# Patient Record
Sex: FEMALE | Race: BLACK OR AFRICAN AMERICAN | Employment: OTHER | URBAN - METROPOLITAN AREA
[De-identification: names, ages, dates, MRNs, and addresses within clinical notes are randomized per-mention and may not be internally consistent; named-entity substitution may affect disease eponyms.]

---

## 2020-01-01 ENCOUNTER — APPOINTMENT (OUTPATIENT)
Dept: NUCLEAR MEDICINE | Facility: HOSPITAL | Age: 85
DRG: 193 | End: 2020-01-01
Attending: EMERGENCY MEDICINE
Payer: MEDICARE

## 2020-01-01 ENCOUNTER — APPOINTMENT (OUTPATIENT)
Dept: GENERAL RADIOLOGY | Facility: HOSPITAL | Age: 85
DRG: 193 | End: 2020-01-01
Attending: HOSPITALIST
Payer: MEDICARE

## 2020-01-01 ENCOUNTER — APPOINTMENT (OUTPATIENT)
Dept: CV DIAGNOSTICS | Facility: HOSPITAL | Age: 85
DRG: 193 | End: 2020-01-01
Attending: INTERNAL MEDICINE
Payer: MEDICARE

## 2020-01-01 ENCOUNTER — HOSPITAL ENCOUNTER (INPATIENT)
Facility: HOSPITAL | Age: 85
LOS: 9 days | Discharge: HOME HEALTH | DRG: 193 | End: 2020-01-01
Attending: EMERGENCY MEDICINE | Admitting: HOSPITALIST
Payer: MEDICARE

## 2020-01-01 ENCOUNTER — APPOINTMENT (OUTPATIENT)
Dept: GENERAL RADIOLOGY | Facility: HOSPITAL | Age: 85
DRG: 193 | End: 2020-01-01
Attending: EMERGENCY MEDICINE
Payer: MEDICARE

## 2020-01-01 VITALS
WEIGHT: 162 LBS | SYSTOLIC BLOOD PRESSURE: 121 MMHG | OXYGEN SATURATION: 96 % | TEMPERATURE: 97 F | HEART RATE: 77 BPM | DIASTOLIC BLOOD PRESSURE: 49 MMHG | HEIGHT: 68 IN | BODY MASS INDEX: 24.55 KG/M2 | RESPIRATION RATE: 18 BRPM

## 2020-01-01 DIAGNOSIS — R77.8 ELEVATED TROPONIN: ICD-10-CM

## 2020-01-01 DIAGNOSIS — U07.1 COVID-19: Primary | ICD-10-CM

## 2020-01-01 PROCEDURE — 99233 SBSQ HOSP IP/OBS HIGH 50: CPT | Performed by: HOSPITALIST

## 2020-01-01 PROCEDURE — 99232 SBSQ HOSP IP/OBS MODERATE 35: CPT | Performed by: INTERNAL MEDICINE

## 2020-01-01 PROCEDURE — 71045 X-RAY EXAM CHEST 1 VIEW: CPT | Performed by: HOSPITALIST

## 2020-01-01 PROCEDURE — 93306 TTE W/DOPPLER COMPLETE: CPT | Performed by: INTERNAL MEDICINE

## 2020-01-01 PROCEDURE — 99232 SBSQ HOSP IP/OBS MODERATE 35: CPT | Performed by: PHYSICIAN ASSISTANT

## 2020-01-01 PROCEDURE — 99223 1ST HOSP IP/OBS HIGH 75: CPT | Performed by: HOSPITALIST

## 2020-01-01 PROCEDURE — 78580 LUNG PERFUSION IMAGING: CPT | Performed by: EMERGENCY MEDICINE

## 2020-01-01 PROCEDURE — 99233 SBSQ HOSP IP/OBS HIGH 50: CPT | Performed by: INTERNAL MEDICINE

## 2020-01-01 PROCEDURE — 71045 X-RAY EXAM CHEST 1 VIEW: CPT | Performed by: EMERGENCY MEDICINE

## 2020-01-01 PROCEDURE — 99239 HOSP IP/OBS DSCHRG MGMT >30: CPT | Performed by: HOSPITALIST

## 2020-01-01 PROCEDURE — 99221 1ST HOSP IP/OBS SF/LOW 40: CPT | Performed by: INTERNAL MEDICINE

## 2020-01-01 RX ORDER — AMLODIPINE BESYLATE 10 MG/1
10 TABLET ORAL DAILY
Status: DISCONTINUED | OUTPATIENT
Start: 2020-01-01 | End: 2020-01-01

## 2020-01-01 RX ORDER — GUAIFENESIN 100 MG/5ML
200 SOLUTION ORAL EVERY 4 HOURS PRN
Status: DISCONTINUED | OUTPATIENT
Start: 2020-01-01 | End: 2020-01-01

## 2020-01-01 RX ORDER — DEXAMETHASONE SODIUM PHOSPHATE 10 MG/ML
10 INJECTION, SOLUTION INTRAMUSCULAR; INTRAVENOUS ONCE
Status: COMPLETED | OUTPATIENT
Start: 2020-01-01 | End: 2020-01-01

## 2020-01-01 RX ORDER — LOSARTAN POTASSIUM 100 MG/1
100 TABLET ORAL DAILY
Status: DISCONTINUED | OUTPATIENT
Start: 2020-01-01 | End: 2020-01-01

## 2020-01-01 RX ORDER — POLYETHYLENE GLYCOL 3350 17 G/17G
17 POWDER, FOR SOLUTION ORAL DAILY PRN
Qty: 1 EACH | Refills: 0 | Status: SHIPPED | OUTPATIENT
Start: 2020-01-01

## 2020-01-01 RX ORDER — PSEUDOEPHEDRINE HCL 30 MG
100 TABLET ORAL 2 TIMES DAILY
Qty: 30 CAPSULE | Refills: 0 | Status: SHIPPED | OUTPATIENT
Start: 2020-01-01

## 2020-01-01 RX ORDER — DOCUSATE SODIUM 100 MG/1
100 CAPSULE, LIQUID FILLED ORAL 2 TIMES DAILY
Status: DISCONTINUED | OUTPATIENT
Start: 2020-01-01 | End: 2020-01-01

## 2020-01-01 RX ORDER — FUROSEMIDE 10 MG/ML
80 INJECTION INTRAMUSCULAR; INTRAVENOUS ONCE
Status: COMPLETED | OUTPATIENT
Start: 2020-01-01 | End: 2020-01-01

## 2020-01-01 RX ORDER — AMLODIPINE BESYLATE 10 MG/1
10 TABLET ORAL DAILY
Status: ON HOLD | COMMUNITY
End: 2020-01-01

## 2020-01-01 RX ORDER — ASPIRIN 81 MG/1
324 TABLET, CHEWABLE ORAL ONCE
Status: COMPLETED | OUTPATIENT
Start: 2020-01-01 | End: 2020-01-01

## 2020-01-01 RX ORDER — DEXAMETHASONE SODIUM PHOSPHATE 4 MG/ML
6 VIAL (ML) INJECTION DAILY
Status: DISCONTINUED | OUTPATIENT
Start: 2020-01-01 | End: 2020-01-01

## 2020-01-01 RX ORDER — PANTOPRAZOLE SODIUM 20 MG/1
20 TABLET, DELAYED RELEASE ORAL
Status: DISCONTINUED | OUTPATIENT
Start: 2020-01-01 | End: 2020-01-01

## 2020-01-01 RX ORDER — BISACODYL 10 MG
10 SUPPOSITORY, RECTAL RECTAL
Status: DISCONTINUED | OUTPATIENT
Start: 2020-01-01 | End: 2020-01-01

## 2020-01-01 RX ORDER — OMEPRAZOLE 20 MG/1
20 CAPSULE, DELAYED RELEASE ORAL
COMMUNITY

## 2020-01-01 RX ORDER — ASPIRIN 81 MG/1
81 TABLET, CHEWABLE ORAL DAILY
COMMUNITY

## 2020-01-01 RX ORDER — LEVOFLOXACIN 500 MG/1
500 TABLET, FILM COATED ORAL ONCE
Status: COMPLETED | OUTPATIENT
Start: 2020-01-01 | End: 2020-01-01

## 2020-01-01 RX ORDER — MAG HYDROX/ALUMINUM HYD/SIMETH 400-400-40
5000 SUSPENSION, ORAL (FINAL DOSE FORM) ORAL DAILY
COMMUNITY

## 2020-01-01 RX ORDER — FUROSEMIDE 10 MG/ML
40 INJECTION INTRAMUSCULAR; INTRAVENOUS ONCE
Status: COMPLETED | OUTPATIENT
Start: 2020-01-01 | End: 2020-01-01

## 2020-01-01 RX ORDER — ALLOPURINOL 100 MG/1
100 TABLET ORAL DAILY
COMMUNITY

## 2020-01-01 RX ORDER — LOSARTAN POTASSIUM 100 MG/1
100 TABLET ORAL DAILY
COMMUNITY

## 2020-01-01 RX ORDER — DEXTROSE MONOHYDRATE 25 G/50ML
50 INJECTION, SOLUTION INTRAVENOUS
Status: DISCONTINUED | OUTPATIENT
Start: 2020-01-01 | End: 2020-01-01

## 2020-01-01 RX ORDER — ALLOPURINOL 100 MG/1
100 TABLET ORAL DAILY
Status: DISCONTINUED | OUTPATIENT
Start: 2020-01-01 | End: 2020-01-01

## 2020-01-01 RX ORDER — DIPYRIDAMOLE 25 MG
50 TABLET ORAL DAILY
Status: DISCONTINUED | OUTPATIENT
Start: 2020-01-01 | End: 2020-01-01

## 2020-01-01 RX ORDER — DEXAMETHASONE SODIUM PHOSPHATE 4 MG/ML
6 VIAL (ML) INJECTION EVERY 24 HOURS
Status: DISCONTINUED | OUTPATIENT
Start: 2020-01-01 | End: 2020-01-01

## 2020-01-01 RX ORDER — AZITHROMYCIN 250 MG/1
500 TABLET, FILM COATED ORAL ONCE
Status: DISCONTINUED | OUTPATIENT
Start: 2020-01-01 | End: 2020-01-01

## 2020-01-01 RX ORDER — ONDANSETRON 2 MG/ML
4 INJECTION INTRAMUSCULAR; INTRAVENOUS EVERY 6 HOURS PRN
Status: DISCONTINUED | OUTPATIENT
Start: 2020-01-01 | End: 2020-01-01

## 2020-01-01 RX ORDER — ACETAMINOPHEN 325 MG/1
650 TABLET ORAL EVERY 6 HOURS PRN
Status: DISCONTINUED | OUTPATIENT
Start: 2020-01-01 | End: 2020-01-01

## 2020-01-01 RX ORDER — AZITHROMYCIN 250 MG/1
500 TABLET, FILM COATED ORAL ONCE
Status: COMPLETED | OUTPATIENT
Start: 2020-01-01 | End: 2020-01-01

## 2020-01-01 RX ORDER — MULTIVIT WITH MINERALS/LUTEIN
1000 TABLET ORAL DAILY
COMMUNITY

## 2020-01-01 RX ORDER — ASPIRIN 81 MG/1
81 TABLET, CHEWABLE ORAL DAILY
Status: DISCONTINUED | OUTPATIENT
Start: 2020-01-01 | End: 2020-01-01

## 2020-01-01 RX ORDER — HEPARIN SODIUM 5000 [USP'U]/ML
5000 INJECTION, SOLUTION INTRAVENOUS; SUBCUTANEOUS EVERY 12 HOURS SCHEDULED
Status: DISCONTINUED | OUTPATIENT
Start: 2020-01-01 | End: 2020-01-01

## 2020-01-01 RX ORDER — DIPYRIDAMOLE 50 MG
50 TABLET ORAL DAILY
COMMUNITY

## 2020-01-01 RX ORDER — METOCLOPRAMIDE HYDROCHLORIDE 5 MG/ML
5 INJECTION INTRAMUSCULAR; INTRAVENOUS EVERY 8 HOURS PRN
Status: DISCONTINUED | OUTPATIENT
Start: 2020-01-01 | End: 2020-01-01

## 2020-01-01 RX ORDER — SODIUM PHOSPHATE, DIBASIC AND SODIUM PHOSPHATE, MONOBASIC 7; 19 G/133ML; G/133ML
1 ENEMA RECTAL ONCE AS NEEDED
Status: DISCONTINUED | OUTPATIENT
Start: 2020-01-01 | End: 2020-01-01

## 2020-01-01 RX ORDER — DEXAMETHASONE SODIUM PHOSPHATE 4 MG/ML
4 VIAL (ML) INJECTION DAILY
Status: DISCONTINUED | OUTPATIENT
Start: 2020-01-01 | End: 2020-01-01

## 2020-01-01 RX ORDER — IPRATROPIUM BROMIDE AND ALBUTEROL SULFATE 2.5; .5 MG/3ML; MG/3ML
3 SOLUTION RESPIRATORY (INHALATION) EVERY 6 HOURS PRN
Qty: 300 ML | Refills: 0 | Status: SHIPPED | OUTPATIENT
Start: 2020-01-01 | End: 2021-01-18

## 2020-01-01 RX ORDER — DEXAMETHASONE 4 MG/1
4 TABLET ORAL
Qty: 2 TABLET | Refills: 0 | Status: SHIPPED | OUTPATIENT
Start: 2020-01-01

## 2020-01-01 RX ORDER — PANTOPRAZOLE SODIUM 40 MG/1
40 TABLET, DELAYED RELEASE ORAL
Status: DISCONTINUED | OUTPATIENT
Start: 2020-01-01 | End: 2020-01-01

## 2020-01-01 RX ORDER — POLYETHYLENE GLYCOL 3350 17 G/17G
17 POWDER, FOR SOLUTION ORAL DAILY PRN
Status: DISCONTINUED | OUTPATIENT
Start: 2020-01-01 | End: 2020-01-01

## 2020-12-11 PROBLEM — U07.1 COVID-19: Status: ACTIVE | Noted: 2020-01-01

## 2020-12-11 PROBLEM — R77.8 ELEVATED TROPONIN: Status: ACTIVE | Noted: 2020-01-01

## 2020-12-11 PROBLEM — R09.02 HYPOXEMIA: Status: ACTIVE | Noted: 2020-01-01

## 2020-12-11 PROCEDURE — 99223 1ST HOSP IP/OBS HIGH 75: CPT | Performed by: INTERNAL MEDICINE

## 2020-12-11 NOTE — CONSULTS
[facility] AND [facility]  [facility] Cardiology Consult Note    Philippe Goldman Patient Status:  Inpatient    1925 MRN [id number]   Location [facility]5W Attending Harika Schaeffer MD   Hosp Day # 0 PCP No primary care provider on file.      79 yo female murmur, pericardial rub, S3.  Lungs: Clear without wheezes, rales, rhonchi or dullness. Normal excursions and effort. Abdomen: Soft, non-tender. BS-present. Extremities: Without clubbing, cyanosis or edema. Peripheral pulses are 2+.   Neurologic: Alert

## 2020-12-11 NOTE — CONSULTS
[facility] HOSP - [facility]    Report of Consultation    Neela Reynolds Patient Status:  Inpatient    1925 MRN [id number]   Location [facility]5W Attending Rony Fall MD   Hosp Day # 0 PCP Iliana Luque MD     Date of Admission: Exam:   Blood pressure 154/74, pulse 90, temperature 97.5 °F (36.4 °C), temperature source Temporal, resp. rate 24, SpO2 95 %.     Constitutional: no acute distress  Eyes: PERRL  ENT: nares patent  Cardio: RRR, S1 S2  Respiratory: Some basilar crackles pres as tolerated. -Chest x-ray on presentation with bilateral airspace opacities seen  -Elevated D-dimer. Awaiting VQ scan results  -Continue Decadron at this time  -Evaluated by cardiology. 2D echo pending. Diuresis as tolerated.   -Reviewed vitals, labs a

## 2020-12-11 NOTE — ED PROVIDER NOTES
Patient Seen in: [facility] AND [facility] Emergency Department      History   Patient presents with:  Shortness Of Breath    Stated Complaint: SOB COVID    HPI  89 yoF with PMH of hypertension, gout, remote pulmonary embolism, presents for evaluation of dyspnea Heart sounds: Normal heart sounds. Pulmonary:      Effort: Pulmonary effort is normal.      Comments: Bibasilar crackles  Abdominal:      General: There is no distension. Palpations: Abdomen is soft. Tenderness:  There is no abdominal tenderness All other components within normal limits   PROTHROMBIN TIME (PT) - Abnormal; Notable for the following components:    PT 15.5 (*)     INR 1.25 (*)     All other components within normal limits   PTT, ACTIVATED - Abnormal; Notable for the following compone are advised. Dictated by (CST): Vick Palacios MD on 12/11/2020 at 12:12 PM     Finalized by (CST): Vick Palacios MD on 12/11/2020 at 12:14 PM              MDM    On arrival to the ED, patient is hypoxic approximately 85% on 3 L nasal cannula.   She was Noted POA    COVID-19 U07.1 12/11/2020 Unknown

## 2020-12-11 NOTE — ED INITIAL ASSESSMENT (HPI)
Pt BIBEMS from home with c/o worsening COVID SOB, was on 3L NC at home, found at 83%, EMS placed pt on NRB at 15, sats WNL. Pt sats drop to 78% ORA.  Pt denies CP

## 2020-12-11 NOTE — ED NOTES
Orders for admission, patient is aware of plan and ready to go upstairs, any questions, please call ED RN Greer Padilla at ext: [phone]  VQ scan ordered due to GFR for CT PE r/o  Pt's daughter Bishop Pastrana would like to be contacted on PC, she is POA  [phone]

## 2020-12-11 NOTE — CONSULTS
[facility]    Report of [facility] ID Consultation    Redd Spencer Patient Status:  Emergency    1925 MRN [id number]   Location [address] Attending Christy Luevano MD   Hosp Day # 0 PCP No primary care provider wounds        Results:     Laboratory Data:  Lab Results   Component Value Date    WBC 15.7 (H) 12/11/2020    HGB 13.0 12/11/2020    HCT 38.7 12/11/2020    .0 12/11/2020    CREATSERUM 1.89 (H) 12/11/2020    BUN 33 (H) 12/11/2020     (L) 12/11/

## 2020-12-11 NOTE — H&P
[facility]    PATIENT'S NAME: Nolberto Galvan   ATTENDING PHYSICIAN: Zenaida Celeste MD   PATIENT ACCOUNT#:   [de-identified]    LOCATION:  [place] [address]  MEDICAL RECORD #:   [id number]       YOB: 1925  ADMISSION DATE:       12/11/2020 HISTORY:  Lives with her family. Requires assistance in her basic activities of daily living. REVIEW OF SYSTEMS:  Patient is a poor historian, not able to provide much of history, but she said she is short of breath.   Per the family, this shortness o respiratory failure. PLAN:  The patient will be admitted to the general medical floor, remote telemetry. IV Rocephin, azithromycin, Decadron. Pulmonary and ID consults. Oxygen protocol.   DVT prophylaxis versus continue her anticoagulation from home

## 2020-12-12 PROCEDURE — 99232 SBSQ HOSP IP/OBS MODERATE 35: CPT | Performed by: NURSE PRACTITIONER

## 2020-12-12 NOTE — PROGRESS NOTES
Worsening hypoxia likely secondary to covid and not heart failure    Echo EF normal, CVP by IVC measurement 3 mmHg. If renal function worsens consider giving fluid back after lasix challenge today.       [facility], 12/11/20, 11:05 PM  INTERVENTIONAL CARDI

## 2020-12-12 NOTE — PROGRESS NOTES
[facility]    Progress Note      Assessment and Plan:   1. Respiratory failure status post acute Covid pneumonia–my suspicion is that the radiographic abnormalities of the postinflammatory or fibrotic changes associated with Covid.   The pa INR 1.25 12/11/2020    DDIMER 6.24 12/12/2020    CRP 30.50 12/12/2020    TROP 0.138 12/11/2020     12/11/2020     Right greater than left bilateral patchy infiltrates    Emre Potter MD  Medical Director, Critical Care, [address] [place] [address]  Medical Director,

## 2020-12-12 NOTE — PLAN OF CARE
Covid +  Symptom Onset:  Tmax:  O2    Monitor Labs  LDH: 428  Ferritin: 821  CRP: 30.50  DDimer: 6.24  Troponin: 0.138    Antibiotics: Azithromycin  Blood Thinner:  Decadron: started 12/11  Actemra:  RDV:  CCP:    Xray: bilateral airspace opacities seen  V

## 2020-12-12 NOTE — PROGRESS NOTES
[facility]    Progress Note    Morenita Goel Patient Status:  Inpatient    1925 MRN [id number]   Location [facility]5W Attending Misha Dale,*   Hosp Day # 1 PCP Jose Kingston MD       Subjective:    Tamie Santacruz secondary to covid and not heart failure   Echo EF normal, CVP by IVC measurement 3 mmHg. If renal function worsens consider giving fluid back after lasix challenge today.     Type II NSTEMI  trop peak 0.139    CKD   Renal dysfunction Cr 1.89 to 1.64 after bundle branch block with left axis -bifascicular block.   Voltage criteria for LVH met -Voltage criteria w/o ST/T abnormality may be normal. ABNORMAL No previous ECGs available Electronically signed on 12/11/2020 at 12:34 by Brennon Vallecillo

## 2020-12-12 NOTE — OCCUPATIONAL THERAPY NOTE
OCCUPATIONAL THERAPY EVALUATION - INPATIENT      Room Number: 602/067-N  Evaluation Date: 12/12/2020  Type of Evaluation: Initial       Physician Order: IP Consult to Occupational Therapy  Reason for Therapy: ADL/IADL Dysfunction and Discharge Planning occupational therapy while IP to maximize patient function and increase patient participation, safety, and independence with basic ADL and everyday activities.        DISCHARGE RECOMMENDATIONS: HIEN      OT Device Recommendations: TBD    PLAN  OT Treatment P taking off regular upper body clothing?: A Lot  -   Taking care of personal grooming such as brushing teeth?: A Lot  -   Eating meals?: A Lot    AM-PAC Score:  Score: 12  Approx Degree of Impairment: 66.57%  Standardized Score (AM-PAC Scale): 30.6  CMS Mod

## 2020-12-12 NOTE — PLAN OF CARE
Rapid and PCR negative for Covid over night. Weaning O2 between 8-10L HFNC. SATs drop to 80's and SOB. Fall precautions in place. Call light return demo and within reach. Will continue to monitor with frequent rounding.     Problem: Patient Centered Care  G

## 2020-12-12 NOTE — PHYSICAL THERAPY NOTE
PHYSICAL THERAPY EVALUATION - INPATIENT     Room Number: 953/831-B  Evaluation Date: 12/12/2020  Type of Evaluation: Initial   Physician Order: PT Eval and Treat    Presenting Problem: Hypoxemia, bibasilar pneumonia (history +COVID 4 weeks ago).     Reason Form.  Research supports that patients with this level of impairment may benefit from sub-acute rehab to optimize functional outcomes. Patient will benefit from continued IP PT services to address these deficits in preparation for discharge.     [id number] Preet Lacey functional limits     Lower extremity strength is impaired bilateral LE's: 3-/5    BALANCE  Static Sitting: Fair +  Dynamic Sitting: Fair  Static Standing: Not tested  Dynamic Standing: Not tested    ACTIVITY TOLERANCE  Pulse: 86  Heart Rate Source: Lion Rasmussen minimum assistance with walker - rolling     Goal #2  Current Status    Goal #3 Patient is able to ambulate 25 feet with assist device: walker - rolling at assistance level: minimum assistance while maintaining SpO2>90% with activity   Goal #3   Current St

## 2020-12-12 NOTE — PROGRESS NOTES
[facility] HOSP - [facility]    Progress Note    Jennifer Gaucher Patient Status:  Inpatient    1925 MRN [id number]   Location [facility]5W Attending Minor Jade,*   Hosp Day # 1 PCP Estevan Ruff MD        Subjective:     Petty Dickson 12/12/2020    K 4.7 12/12/2020     12/12/2020    CO2 22.0 12/12/2020     (H) 12/12/2020    CA 9.4 12/12/2020    ALB 2.6 (L) 12/12/2020    ALKPHO 99 12/12/2020    BILT 0.3 12/12/2020    TP 8.7 (H) 12/12/2020    AST 28 12/12/2020    ALT 16 12/12

## 2020-12-12 NOTE — PROGRESS NOTES
INFECTIOUS DISEASE PROGRESS NOTE  [facility] OF [facility] ID PROGRESS NOTE    Royce Aguilar Patient Status:  Inpatient    1925 MRN [id number]   Location [facility]5W Attending Hemal Castellon Day # 1 PCP Flaca Kaiser -  Continue on ceftriaxone and azithromycin.  -  Decadron.  -  DC COVID-19 isolation.  -  Follow fever curve, wbc. Repeat CBC/diff tomorrow AM.  -  Reviewed labs, micro, imaging reports.  -  Case d/w patient, RN.     Jackson Hatch PA-C   [facility]

## 2020-12-13 PROCEDURE — 99232 SBSQ HOSP IP/OBS MODERATE 35: CPT | Performed by: NURSE PRACTITIONER

## 2020-12-13 NOTE — PROGRESS NOTES
Pulmonary/Critical Care Follow Up Note    HPI:   Dami Grossman is a 80year old female with Patient presents with:  Savannah Brooke      PCP Alessandro Kraus MD  Admission Attending Ab Harrison MD    Hospital Day #2    Weakness  No sob  No coug Allergies        PHYSICAL EXAM:   Blood pressure 143/52, pulse 78, temperature 98.4 °F (36.9 °C), temperature source Oral, resp. rate 20, weight 164 lb 8 oz (74.6 kg), SpO2 98 %.     Intake/Output Summary (Last 24 hours) at 12/13/2020 1307  Last data filed

## 2020-12-13 NOTE — PROGRESS NOTES
[facility]    Progress Note    [facility] Patient Status:  Inpatient    1925 MRN [id number]   Location [facility]5W Attending Екатерина Watson,*   Hosp Day # 2 PCP Sal Sanford MD        Subjective:     Olga Cheek 12/13/2020    K 4.6 12/13/2020     12/13/2020    CO2 24.0 12/13/2020     (H) 12/13/2020    CA 9.2 12/13/2020    ALB 2.6 (L) 12/12/2020    ALKPHO 99 12/12/2020    BILT 0.3 12/12/2020    TP 8.7 (H) 12/12/2020    AST 28 12/12/2020    ALT 16 12/12

## 2020-12-13 NOTE — PLAN OF CARE
Problem: Patient Centered Care  Goal: Patient preferences are identified and integrated in the patient's plan of care  Description: Interventions:  - What would you like us to know as we care for you? I live with my niece and she is my POA.   - Provide ti

## 2020-12-13 NOTE — PROGRESS NOTES
[facility]    [facility] Note    Lise Coulter Patient Status:  Inpatient    1925 MRN [id number]   Location [facility]5W Attending Arely Nunn,*   Hosp Day # 2 PCP Roya Angelo MD       Subjective:    Chay Aguilera bilateral.        Assessment and Plan:       COVID pneumonia   seems to be decompensation, CXR infiltrates unclear if worse or better.    BNP 1400 and mildly elevated troponin may suggest component of heart failure contributing,   heart failure would not e 12/13/2020 at 7:39 AM                  ANGELI Miranda  12/13/2020

## 2020-12-14 PROCEDURE — 99232 SBSQ HOSP IP/OBS MODERATE 35: CPT | Performed by: NURSE PRACTITIONER

## 2020-12-14 NOTE — PLAN OF CARE
IV abx. IV decadron. NC at 3L. Patient's O2 saturations dropped to 78% after PT ambulated pt to the chair. NC was increased to 5L for an hour. Patient turns self. Encouraged her to turn and reposition throughout the day. Continue to monitor.  Call light wit stability  Description: INTERVENTIONS:  - Monitor vital signs, rhythm, and trends  - Monitor for bleeding, hypotension and signs of decreased cardiac output  - Evaluate effectiveness of vasoactive medications to optimize hemodynamic stability  - Monitor ar

## 2020-12-14 NOTE — CM/SW NOTE
TIM received MDO for home health. Per chart review, PT recommending subacute rehab. TIM contacted patient's niece, Christina Wyman P# [phone], to complete discharge planning assessment. Adelso Menezes stated that she lives with patient in a one level home. reasonable and necessary. SW/CM to follow for discharge planning and support.     Jerrol Rinne, MSW, LSW

## 2020-12-14 NOTE — PROGRESS NOTES
[facility]    Progress Note    Jaymie San German Patient Status:  Inpatient    1925 MRN [id number]   Location [facility]5W Attending Louis Grewal,*   Hosp Day # 3 PCP Len Gill MD        Subjective:     Ca relate to subacute COVID-19 pneumonia or post infectious fibrosis from recent COVID-19 pneumonia.    Dictated by (CST): Hyacinth Del Toro MD on 12/13/2020 at 7:36 AM     Finalized by (CST): Hyacinth Del Toro MD on 12/13/2020 at 7:39 AM                       Trevin

## 2020-12-14 NOTE — PROGRESS NOTES
[facility]    Progress Note    Dami Grossman Patient Status:  Inpatient    1925 MRN [id number]   Location [facility]5W Attending Tasha Carter,*   Hosp Day # 3 PCP Alessandro Kraus MD        Subjective:    The 138 12/13/2020    K 4.6 12/13/2020     12/13/2020    CO2 24.0 12/13/2020     (H) 12/13/2020    CA 9.2 12/13/2020    ALB 2.6 (L) 12/12/2020    ALKPHO 99 12/12/2020    BILT 0.3 12/12/2020    TP 8.7 (H) 12/12/2020    AST 28 12/12/2020    ALT 16 1

## 2020-12-14 NOTE — PROGRESS NOTES
[facility]    Progress Note    Redd Spencer Patient Status:  Inpatient    1925 MRN [id number]   Location [facility]5W Attending Jim Card,*   Hosp Day # 3 PCP Leticia Stokes MD        Subjective:     Aniya Barajas wheelchair, or for stand and pivot transfer. Patient requires frequent changes in body position and/or has an immediate need for change in body postion.  Patient is bed bound and is not able to reposition him/her self and needs the bed to alleviate pain in

## 2020-12-14 NOTE — PLAN OF CARE
Pt axox4. 5LNC. Lungs diminished. X2 assist oob to chair. Incontinent of urine, purewick in place. Clear yellow, good output. Healing stage 2 to sacrum, new mepilex placed, CDI.  RN educated pt on proper use of incentive spirometer, RN observed pt properly

## 2020-12-14 NOTE — PROGRESS NOTES
INFECTIOUS DISEASE PROGRESS NOTE  [facility] ID PROGRESS NOTE    [facility] Patient Status:  Inpatient    1925 MRN [id number]   Location [facility]5W Attending Home Castellon Day # 3 PCP Mike Villagran -VQ with low probability for PE      PLAN:  -  Continue on ceftriaxone and azithromycin.  -  Decadron.  -  Follow fever curve, wbc.  Repeat CBC  -  Reviewed labs, micro, imaging reports.  -  Monitor for improvement  -  D/w staff  -  Will follow    Erma Thorne

## 2020-12-15 NOTE — PROGRESS NOTES
[facility] HOS[facility] - [facility]    Progress Note    Darianaugustine Salgadonew Patient Status:  Inpatient    1925 MRN [id number]   Location [facility]5W Attending Loistine Dancer,*   Hosp Day # 4 PCP Joby Garcia MD        Subjective:    The ALB 2.6 (L) 12/12/2020    ALKPHO 99 12/12/2020    BILT 0.3 12/12/2020    TP 8.7 (H) 12/12/2020    AST 28 12/12/2020    ALT 16 12/12/2020    PTT 41.1 (H) 12/11/2020    INR 1.25 (H) 12/11/2020    DDIMER 6.52 (H) 12/13/2020    CRP 30.50 (H) 12/12/2020    MG 2

## 2020-12-15 NOTE — CDS QUERY
Status of Stated Diagnosis  CLINICAL DOCUMENTATION CLARIFICATION FORM    Dear Doctor:  Clinical information (provided below) includes a diagnosis that requires additional specificity regarding status.  Please clarify the current status of the patient’s COVI

## 2020-12-15 NOTE — PROGRESS NOTES
[facility] HOSP - [facility]    Progress Note    Philippeagnes Goldman Patient Status:  Inpatient    1925 MRN [id number]   Location [facility]5W Attending Molina Akhtar,*   Hosp Day # 4 PCP Luigi Amezquita MD        Subjective:     Neris Galeano would require him/her to have to elevate his/her head 30 degrees or more in order to avoid dyspnea. A variable height bed surface is required to permit safe transfer from the hospital to a chair, wheelchair, or for stand and pivot transfer.  Patient require

## 2020-12-15 NOTE — PLAN OF CARE
Turning and repositioning patient desat to SpO2 78%. Increased oxygen and elevated the HOB at this time. Patient's SpO2 increased to >94%.     I decreased oxygen back to 3L with SpO2 98%

## 2020-12-15 NOTE — PROGRESS NOTES
INFECTIOUS DISEASE PROGRESS NOTE  [facility] ID PROGRESS NOTE    Rincon Splinter Patient Status:  Inpatient    1925 MRN [id number]   Location [facility]5W Attending Home Castellon Day # 4 PCP Cassandra Campos # Acute hypoxic respiratory failure  # Leukocytosis, due to steroids  # DREW  # Elevated markers  # Elevated D-dimer   -VQ with low probability for PE      PLAN:  -  On ceftriaxone and azithromycin, day #5, likely can d/c  -  on Decadron x 10 days  -  [facility]

## 2020-12-15 NOTE — PHYSICAL THERAPY NOTE
PHYSICAL THERAPY TREATMENT NOTE - INPATIENT     Room Number: 777/707-F       Presenting Problem: post Covid 19 inflamatory lung exacerbation    Problem List  Principal Problem:    COVID-19  Active Problems:    Elevated troponin    Hypoxemia      PHYSICAL T OBJECTIVE  Precautions: Bed/chair alarm(02 desaturations with movement)    WEIGHT BEARING RESTRICTION  Weight Bearing Restriction: None    PAIN ASSESSMENT   Ratin    BALANCE set    CURRENT GOALS   Goals to be met by: 12/26/2020  Patient Goal Patient's self-stated goal is: return to PLOF   Goal #1 Patient is able to demonstrate supine - sit EOB @ level: minimum assistance     Goal #1   Current Status Goal met     Updated (12/15

## 2020-12-15 NOTE — DIETARY NOTE
ADULT NUTRITION INITIAL ASSESSMENT    Pt is at moderate nutrition risk. Pt does not meet malnutrition criteria.       RECOMMENDATIONS TO MD:  See Nutrition Intervention     NUTRITION DIAGNOSIS/PROBLEM:  Increased nutrient needs related to physiological cau collaboration with other providers.  Discussed pt & Oral Nutritional supplement added with RN caring for pt  - Discharge and transfer of nutrition care to new setting or provider: Pt from home with family, monitor plans    ADMITTING DIAGNOSIS:   Elevated tr 12/15/20  0544   * 148* 117*   BUN 35* 48* 42*   CREATSERUM 1.64* 1.73* 1.19*   CA 9.4 9.2 8.9   MG  --  2.5 2.6    138 138   K 4.7 4.6 4.5    106 106   CO2 22.0 24.0 23.0   PHOS  --  4.0  --    OSMOCALC 295 301* 298*     NUTRITION RELAT

## 2020-12-15 NOTE — PLAN OF CARE
Husam Nate, niece of patient, and patient requested that all information be given to Dr. Carlos Samayoa and Candance Gouge. Dr. Carlos Samayoa MD, cousin of Minerva Farzana. ([phone])      Dr. Menedz Lorenz.  Josef Christy MD. ([phone])

## 2020-12-15 NOTE — SLP NOTE
ADULT SWALLOWING EVALUATION    ASSESSMENT    ASSESSMENT/OVERALL IMPRESSION:  PPE REQUIRED. THIS SLP WORE GOWN, GLOVES, AND DROPLET MASK. HANDS SANITIZED/WASHED UPON ENTRANCE/EXIT. SLP BSSE orders received and acknowledged.  A swallow evaluation warrante bites and sips; Alternate consistencies  Aspiration Precautions: Upright position; Slow rate;Small bites and sips; No straw  Medication Administration Recommendations: Crushed in puree  Treatment Plan/Recommendations: Aspiration precautions; Dysphagia therapy Intact  Bolus Formation: Impaired  Bolus Propulsion: Impaired  Mastication: Impaired       Pharyngeal Phase of Swallow: Impaired  Laryngeal Elevation Timing: Appears impaired  Laryngeal Elevation Strength: Appears intact  Laryngeal Elevation Coordination:

## 2020-12-15 NOTE — PLAN OF CARE
Patient resting well overnight. Sob and desat at start of shift with standing  to turn and pivot to chair to bed. O2 was increased to 5L patient recovered into the low 90's. Plan for patient to return home with home health when able to dc.       Problem: Pa and temperature  - Assess for signs of decreased coronary artery perfusion - ex.  Angina  - Evaluate fluid balance, assess for edema, trend weights  Outcome: Progressing  Goal: Absence of cardiac arrhythmias or at baseline  Description: INTERVENTIONS:  - Co

## 2020-12-16 NOTE — PROGRESS NOTES
[facility] H[facility] - [facility]    Progress Note    Swapna Brightyolanda Patient Status:  Inpatient    1925 MRN [id number]   Location [facility] 3W/SW Attending Lila Davila, [address] [facility] Day # 5 PCP Gail Lane MD        Subjective:   Ana Orantes DDIMER 6.52 (H) 12/13/2020    CRP 30.50 (H) 12/12/2020    MG 2.6 12/16/2020    PHOS 4.0 12/13/2020    TROP 0.138 (HH) 12/11/2020     12/11/2020       Xr Chest Ap Portable  (cpt=71045)    Result Date: 12/16/2020  CONCLUSION:  1.  Persistent bilateral p

## 2020-12-16 NOTE — PLAN OF CARE
Patient transferred from the 5th floor. Received on 10L of O2, was able to titrate down to 8L. Patient does desat to upper 80s with movement. Alert/orientated x2. Purewick in place. Plan to d/c home with [place] [id number] when medically stable.      Problem: Patient Cent temperature  - Assess for signs of decreased coronary artery perfusion - ex.  Angina  - Evaluate fluid balance, assess for edema, trend weights  Outcome: Progressing  Goal: Absence of cardiac arrhythmias or at baseline  Description: INTERVENTIONS:  - Contin

## 2020-12-16 NOTE — PHYSICAL THERAPY NOTE
PHYSICAL THERAPY TREATMENT NOTE - INPATIENT     Room Number: 327/697-Y       Presenting Problem: post Covid 19 inflamatory lung exacerbation    Problem List  Principal Problem:    COVID-19  Active Problems:    Elevated troponin    Hypoxemia      PHYSICAL T with de sat when speaking as well, 86%. DISCHARGE RECOMMENDATIONS  PT Discharge Recommendations: 24 hour care/supervision;Sub-acute rehabilitation(family wants home, will need bed, lift and wheelchair)     PLAN  PT Treatment Plan: Bed mobility; Body mec by: 12/26/2020  Patient Goal Patient's self-stated goal is: return to PLOF   Goal #1 Patient is able to demonstrate supine - sit EOB @ level: minimum assistance      Goal #1   Current Status Goal met      Updated (12/15/2020):  Pt will be able to perform mendosa

## 2020-12-16 NOTE — PROGRESS NOTES
INFECTIOUS DISEASE PROGRESS NOTE  [facility]ESS NOTE    Rincon Splinter Patient Status:  Inpatient    1925 MRN [id number]   Location [facility]5W Attending Home Castellon Day # 5 PCP Cassandra Campos 80year old with a h/o hypertension, gout, remote pulmonary embolism, presents for evaluation of dyspnea. Apparently diagnosed with COVID 19 on 11/5 while she was admitted at [facility] for UTI.  She was hospitalized for 6 days, treated

## 2020-12-16 NOTE — PLAN OF CARE
Patient had episodes of desaturation. Occurred while eating, repositioning, and turning. HOB was elevated and oxygen was increased as needed. Patient recovered quickly. MD notified. Niece stated that these episodes happened at home. IV decadron given. Problem: CARDIOVASCULAR - ADULT  Goal: Maintains optimal cardiac output and hemodynamic stability  Description: INTERVENTIONS:  - Monitor vital signs, rhythm, and trends  - Monitor for bleeding, hypotension and signs of decreased cardiac output  - Evalua

## 2020-12-16 NOTE — SLP NOTE
SPEECH DAILY NOTE - INPATIENT    ASSESSMENT & PLAN   ASSESSMENT    PPE REQUIRED. THIS THERAPIST WORE GOGGLE, GLOVES, AND DROPLET MASK FOR DURATION OF TX SESSION. HANDS WASHED UPON ENTRANCE/EXIT.      SLP follow up for ongoing meal assessment per recommendat clear of residue. No diet upgrade at this time. Thin liquids were not tested at this session secondary to increased work of breathing at onset of therapy and delayed oropharyngeal transit times. New CXR completed.     CXR 12/16/20:  =====  CONCLUSION:   1 increased mouth breathing at the start of therapy. Max cues provided to the pt to breathe through her nose vs mouth. RN increased O2 from 10L to 12L.   In Progress   Goal #3 The patient will utilize compensatory strategies as outlined by  BSSE (clinical e

## 2020-12-17 NOTE — PLAN OF CARE
Evita Freitas at bedside. Family updated on POC over the phone. Pt straight cathed for urine culture sample. O2 weaned as tolerated. Continuous pulse ox in place. Pt expressed she felt very tired. All questions answered to the best of my ability.     Problem: P color and temperature  - Assess for signs of decreased coronary artery perfusion - ex.  Angina  - Evaluate fluid balance, assess for edema, trend weights  Outcome: Progressing  Goal: Absence of cardiac arrhythmias or at baseline  Description: INTERVENTIONS:

## 2020-12-17 NOTE — SLP NOTE
SPEECH DAILY NOTE - INPATIENT    ASSESSMENT & PLAN   ASSESSMENT  Contacted patient at the bedside for dysphagia tx to assess tolerance with recommended diet, ensure proper utilization of aspiration precautions, Trials of upgraded liquids of thin via spoon posted at bedside          GOALS  Goal #1 The patient will tolerate CHOPPED consistency and NECTAR THICKENED  liquids without overt signs or symptoms of aspiration with 100 % accuracy over 1-2 session(s).      The pt tolerated chopped solids and NTL without

## 2020-12-17 NOTE — PLAN OF CARE
Pt is on high flow nasal cannula at 8L. On continuous pulse oxygen, goes down as she moves. Pt is asleep now. Plan is to wean off oxygen. No complaints of pain or SOB. Bed locked, in lowest position, call light within reach, and nonskid socks on.  Spoke wit and/or hematoma  - Assess quality of pulses, skin color and temperature  - Assess for signs of decreased coronary artery perfusion - ex.  Angina  - Evaluate fluid balance, assess for edema, trend weights  Outcome: Progressing  Goal: Absence of cardiac arrhy

## 2020-12-17 NOTE — PLAN OF CARE
Pt c/o L foot pain-- relieved w/ PRN Tylenol. MD notified. Uric acid lab draw ordered. Mepilex in place on sacrum. Pt repositioned throughout the day. Evita Ng at bedside. Requiring up to 9L O2 at this time. SLP cleared for thin liquids-- see order.  POC d Assess quality of pulses, skin color and temperature  - Assess for signs of decreased coronary artery perfusion - ex.  Angina  - Evaluate fluid balance, assess for edema, trend weights  Outcome: Progressing  Goal: Absence of cardiac arrhythmias or at baseli Notify MD/LIP if interventions unsuccessful or patient reports new pain  - Anticipate increased pain with activity and pre-medicate as appropriate  Outcome: Progressing     Problem: SAFETY ADULT - FALL  Goal: Free from fall injury  Description: INTERVENTIO

## 2020-12-17 NOTE — PROGRESS NOTES
[place] [facility]    Progress Note    Jay Moore Patient Status:  Inpatient    1925 MRN [id number]   Location [facility] 3W/SW Attending Ambrosio Gonzalez, [address] [facility] Day # 6 PCP Robb Mobley MD       Subjective:    Berkley Zarate Intravenous, Daily    •  amLODIPine Besylate (NORVASC) tab 10 mg, 10 mg, Oral, Daily    •  allopurinol (ZYLOPRIM) tab 100 mg, 100 mg, Oral, Daily    •  aspirin chewable tab 81 mg, 81 mg, Oral, Daily    •  dipyridamole (PERSANTINE) tab 50 mg, 50 mg, Oral, D 110   CO2 23.0 25.0 22.0             Assessment and Plan:        1.       Bilateral infiltrates suggestive of pneumonia versus residual scarring and inflammatory changes from recent COVID-19 infection.    Now off abx  - apprec id consult - monitor off abx

## 2020-12-17 NOTE — PROGRESS NOTES
[facility]    Progress Note    Darianaugustine Salgadomarieldonald Patient Status:  Inpatient    1925 MRN [id number]   Location [facility] 3W/SW Attending Darryl Izquierdo, [address] [facility] Day # 5 PCP Joby Garcia MD       Subjective:    Kalin Ervin Daily    •  amLODIPine Besylate (NORVASC) tab 10 mg, 10 mg, Oral, Daily    •  allopurinol (ZYLOPRIM) tab 100 mg, 100 mg, Oral, Daily    •  aspirin chewable tab 81 mg, 81 mg, Oral, Daily    •  dipyridamole (PERSANTINE) tab 50 mg, 50 mg, Oral, Daily    •  lo 24.0 23.0 25.0             Assessment and Plan:        1.       Bilateral infiltrates suggestive of pneumonia versus residual scarring and inflammatory changes from recent COVID-19 infection.    Now off abx  - apprec id consult - monitor off abx   - cbc in

## 2020-12-17 NOTE — PROGRESS NOTES
[facility]    Progress Note    [facility] Patient Status:  Inpatient    1925 MRN [id number]   Location [facility] 3W/SW Attending Bon Ragsdale, [address] [facility] Day # 6 PCP Sal Sanford MD        Subjective:    The p 12/17/2020     12/17/2020    K 4.4 12/17/2020     12/17/2020    CO2 22.0 12/17/2020     (H) 12/17/2020    CA 9.2 12/17/2020    ALB 2.6 (L) 12/12/2020    ALKPHO 99 12/12/2020    BILT 0.3 12/12/2020    TP 8.7 (H) 12/12/2020    AST 28 12/12

## 2020-12-17 NOTE — DISCHARGE PLANNING
Patient's oxygen saturation is 85% on 6L O2 at rest.  Patient's oxygen saturation is at 88% on 7L O2 at rest.  Patient's oxygen saturation is at 90% on 8L O2 at rest.  Patient's oxygen saturation is at 95% on 9L O2 at rest.

## 2020-12-17 NOTE — CM/SW NOTE
08: 30AM  SW returned message from pt's niece, Antonio Herrera ([phone]) to discuss pt's DME and increased O2 needs. SW confirmed that DME for hospital bed and nebulizer were initiated.     Pt's niece mentioned that family began discussing hospice yesterday stand and pivot transfer. Patient requires frequent changes in body position and/or has an immediate need for change in body postion.  Patient is bed bound and is not able to reposition him/her self and needs the bed to alleviate pain in his/her (body part)

## 2020-12-18 NOTE — PROGRESS NOTES
[facility]    Progress Note    Jennifer Nguyen Patient Status:  Inpatient    1925 MRN [id number]   Location [facility] 3W/SW Attending Luca Modi, [address] [facility] [address] Day # 7 PCP Baldo Singh MD        Subjective:    The p consult  -Will continue to follow    # Leukocytosis  -? Reactive to steroids  -Increasing wbc  -Urine cx with ngtd  -Blood cx with ngtd  -Plan:   -Follow    DVT prophylaxis: Heparin SQ    D/w RN and Dr. Hilary Anne.     Results:     Lab Results   Component Value

## 2020-12-18 NOTE — PLAN OF CARE
A&Ox3, forgetful at times, on 10L high flow NC, no complaints of pain. Changed positions with no complaints of SOB. Mepilex in place on sacrum, bed locked/lowest position, and call light within reach. Plan for home health.    Problem: Patient Centered Care Assess for signs of decreased coronary artery perfusion - ex.  Angina  - Evaluate fluid balance, assess for edema, trend weights  Outcome: Progressing  Goal: Absence of cardiac arrhythmias or at baseline  Description: INTERVENTIONS:  - Continuous cardiac mo reports new pain  - Anticipate increased pain with activity and pre-medicate as appropriate  Outcome: Progressing     Problem: SAFETY ADULT - FALL  Goal: Free from fall injury  Description: INTERVENTIONS:  - Assess pt frequently for physical needs  - Ident

## 2020-12-18 NOTE — CM/SW NOTE
08: 40AM  TIM obtained signed HME order for bed/nebulizer as well as MD document for bed. TIM sent these to E via Aidin on Thursday 12/17. Per HME, still need MD note showing qualifying dx for Nebulizer machine.  TIM sent latest Pulm note and will f/up w/ HME confirmed requested DME from pt's niece has been arranged, pending pt's d/c.    04:40PM  SW set Ambulance (7L O2) on WILL CALL for Saturday, Sunday and Monday. PCS completed in Epic - pt's RN to print w/ pt's AVS.    SW to notify [facility] at time of d/c.

## 2020-12-18 NOTE — OCCUPATIONAL THERAPY NOTE
OCCUPATIONAL THERAPY TREATMENT NOTE - INPATIENT        Room Number: 476/212-L                Problem List  Principal Problem:    COVID-19  Active Problems:    Elevated troponin    Hypoxemia      OCCUPATIONAL THERAPY ASSESSMENT     Pt was NOT wearing a mask ACTIVITY TOLERANCE  Pulse: 85  Heart Rate Source: Monitor       O2 SATURATIONS        SPO2 Ambulation on Oxygen: 91(Dropped to 81% with minimal activity)  Ambulation oxygen flow (liters per minute): 10    ACTIVITIES OF DAILY LIVING ASSESSMENT  AM-P

## 2020-12-18 NOTE — PROGRESS NOTES
[facility]    Progress Note    Sean Palumbolisa Patient Status:  Inpatient    1925 MRN [id number]   Location [facility] 3W/SW Attending Aaliyah Lim, [address] [facility] Day # 7 PCP Zurdo Goodson MD       Subjective:    Roberto Dial Subcutaneous, TID CC    •  amLODIPine Besylate (NORVASC) tab 10 mg, 10 mg, Oral, Daily    •  allopurinol (ZYLOPRIM) tab 100 mg, 100 mg, Oral, Daily    •  aspirin chewable tab 81 mg, 81 mg, Oral, Daily    •  dipyridamole (PERSANTINE) tab 50 mg, 50 mg, Oral, ntd   - afebrile   - wean oxygen as able  - now on 10 L      2.       Slightly elevated D-dimer.  History of pulmonary embolism and deep vein thrombosis.  vq low prob  3.       Hypoxemic respiratory failure.  better still desats with therapy  4.        Weak

## 2020-12-18 NOTE — PHYSICAL THERAPY NOTE
PHYSICAL THERAPY TREATMENT NOTE - INPATIENT     Room Number: 109/053-R       Presenting Problem: post Covid 19 inflamatory lung exacerbation    Problem List  Principal Problem:    COVID-19  Active Problems:    Elevated troponin    Hypoxemia      PHYSICAL T has been calculated based on documentation in the [facility] '6 clicks' Inpatient Basic Mobility Short Form. Research supports that patients with this level of impairment may benefit from rehab facility however patient would benefit from home health if able. bed;Needs met;Call light within reach;RN aware of session/findings; All patient questions and concerns addressed; Family present    CURRENT GOALS   Goals to be met by: 12/26/2020  Patient Goal Patient's self-stated goal is: return to PLOF   Goal #1 Patient i

## 2020-12-19 NOTE — PROGRESS NOTES
[facility]    Progress Note    vEan Jordan Patient Status:  Inpatient    1925 MRN [id number]   Location [facility] 3W/SW Attending Brandon Watson, [address] [facility] Day # 8 PCP Luiz Bell MD       Subjective:    [place] Or    •  Glucose-Vitamin C (DEX-4) chewable tab 4 tablet, 4 tablet, Oral, Q15 Min PRN    Or    •  dextrose 50 % injection 50 mL, 50 mL, Intravenous, Q15 Min PRN    Or    •  glucose (DEX4) oral liquid 30 g, 30 g, Oral, Q15 Min PRN    Or    •  Glucose-Vitami  109 107   CO2 22.0 22.0 23.0             Assessment and Plan:        1.       Bilateral infiltrates suggestive of pneumonia versus residual scarring and inflammatory changes from recent COVID-19 infection.    Now off abx  - apprec id consult - roberto

## 2020-12-19 NOTE — PROGRESS NOTES
INFECTIOUS DISEASE PROGRESS NOTE  [facility] ID PROGRESS NOTE    [facility] Patient Status:  Inpatient    1925 MRN [id number]   Location [facility]5W Attending [facility] [place] Day # 7 PCP Marc Lackey 80year old with a h/o hypertension, gout, remote pulmonary embolism, presents for evaluation of dyspnea. Apparently diagnosed with COVID 19 on 11/5 while she was admitted at [facility] for UTI.  She was hospitalized for 6 days, treated

## 2020-12-19 NOTE — CM/SW NOTE
12/19/20 1400   Discharge disposition   Expected discharge disposition Home-Health   Name of [address] Jonny Maurer Provider   ([facility])   Home services after discharge DME   Discharge transportation [facility]

## 2020-12-19 NOTE — PLAN OF CARE
Patient cooperative and pleasant; resting comfortably in bed all day; goal is to get patient home once WBC count is addressed and acceptable for discharge.   Equipment being delivered to patient's home tomorrow; POA was asking if another carepartner could c trends  - Monitor for bleeding, hypotension and signs of decreased cardiac output  - Evaluate effectiveness of vasoactive medications to optimize hemodynamic stability  - Monitor arterial and/or venous puncture sites for bleeding and/or hematoma  - Assess non-pharmacological measures as appropriate and evaluate response  - Consider cultural and social influences on pain and pain management  - Manage/alleviate anxiety  - Utilize distraction and/or relaxation techniques  - Monitor for opioid side effects  - N Progressing

## 2020-12-19 NOTE — SLP NOTE
SPEECH DAILY NOTE - INPATIENT    ASSESSMENT & PLAN   ASSESSMENT  PPE REQUIRED. THIS SLP WORE GLOVES AND DROPLET MASK. HANDS SANITIZED/WASHED UPON ENTRANCE/EXIT.     Per RN, pt with poor appetite but is tolerating current diet without overt clinical signs or accuracy. F/u x1.   In Progress   Goal #2 The patient will utilize compensatory strategies as outlined by  BSSE (clinical evaluation) including Slow rate, Small bites, Small sips, No straws, Upright 90 degrees, Upright 90 degrees 30 mins after meal with int

## 2020-12-19 NOTE — PLAN OF CARE
No complaints overnight. Pt maintained on 10-12L HFNC with oxygen saturations > 95%. Patient continues to desat in [de-identified] to low 90s with coughing and repositioning. Con't IV decadron. Discharge home with [facility] once medically cleared.  Trend WBC     Problem: Tatyana Moder and temperature  - Assess for signs of decreased coronary artery perfusion - ex.  Angina  - Evaluate fluid balance, assess for edema, trend weights  Outcome: Progressing  Goal: Absence of cardiac arrhythmias or at baseline  Description: INTERVENTIONS:  - Co unsuccessful or patient reports new pain  - Anticipate increased pain with activity and pre-medicate as appropriate  Outcome: Progressing     Problem: SAFETY ADULT - FALL  Goal: Free from fall injury  Description: INTERVENTIONS:  - Assess pt frequently for

## 2020-12-20 PROCEDURE — 99232 SBSQ HOSP IP/OBS MODERATE 35: CPT | Performed by: INTERNAL MEDICINE

## 2020-12-20 NOTE — PROGRESS NOTES
Pulmonary/Critical Care Follow Up Note    HPI:   Efren Castleman is a 80year old female with Patient presents with:  Romana Lemon      PCP Joby Garcia MD  Admission Attending Delio Real MD    Hospital Day #8    Weakness  No sob  No coug Daily  •  losartan (COZAAR) tab 100 mg, 100 mg, Oral, Daily      Allergies:  No Known Allergies        PHYSICAL EXAM:   Blood pressure 126/45, pulse (!) 41, temperature 97.6 °F (36.4 °C), temperature source Oral, resp.  rate 18, height 5' 8\" (1.727 m), Sandy Steward

## 2020-12-20 NOTE — DISCHARGE SUMMARY
[place] [facility] - [facility]    Discharge Summary    Morenita Goel Patient Status:  Inpatient    1925 MRN [id number]   Location [facility] 3W/SW Attending Sally Rizvi, [address] [facility] Day # 9 PCP Jose Kingston MD     Date of Admission: oxygen was initiated. CBC showed white blood cell count of 15.7 with left shift. D-dimer 7.83. INR 1.25. Other inflammatory markers, chemistry are still pending. Rapid COVID testing was obtained and procalcitonin.   Chest x-ray showed moderate extensiv changes in body position and/or has an immediate need for change in body postion. Patient is bed bound and is not able to reposition him/her self and needs the bed to alleviate pain in his/her back.  It is in my opinion that a semielectric hospital bed is r 0     losartan 100 MG Tabs  Commonly known as: COZAAR      Take 100 mg by mouth daily.    Refills: 0     omeprazole 20 MG Cpdr  Commonly known as: PRILOSEC      Take 20 mg by mouth every morning before breakfast.   Refills: 0     vitamin C 1000 MG Tabs

## 2020-12-20 NOTE — PLAN OF CARE
OXYGEN WEANED TO 7L HIGH FLOW, CONTINUING PUREWICK, ACCUCHECKS, IVP DECADRON, POOR APPETITE, HEART RATE SUSTAINING IN 40'S THIS AFTERNOON, EKG DONE, PLAN TO MONITOR OVERNIGHT, PLAN TO DISCHARGE HOME TO NIECE'S HOUSE WITH HOME HEALTH AND HOME EQUIPMENT SHENA goals for specific interventions  Outcome: Not Progressing     Problem: CARDIOVASCULAR - ADULT  Goal: Maintains optimal cardiac output and hemodynamic stability  Description: INTERVENTIONS:  - Monitor vital signs, rhythm, and trends  - Monitor for bleeding and/or relaxation techniques  - Monitor for opioid side effects  - Notify MD/LIP if interventions unsuccessful or patient reports new pain  - Anticipate increased pain with activity and pre-medicate as appropriate  Outcome: Not Progressing     Problem: SAF symptoms of hyperglycemia and hypoglycemia  - Administer ordered medications to maintain glucose within target range  - Assess barriers to adequate nutritional intake and initiate nutrition consult as needed  - Instruct patient on self management of diabet

## 2020-12-20 NOTE — PROGRESS NOTES
[facility] sent 2 paramedics who were ACLS certified to  patient and bring her home; this RN notified the POA patient was on her way home. This RN explained to paramedics patient's O2 levels plummet quickly upon any movement.   Patient cooperative and

## 2020-12-20 NOTE — PLAN OF CARE
Gregory Dias is alert. Shortness of breath with exertion seems to be improving with 7L of O2. Mac Plush in place for incontinence. Incontinent of stool. Discharge plan is for home with niece likely today.   Problem: Patient Centered Care  Goal: Patient preferences decreased coronary artery perfusion - ex.  Angina  - Evaluate fluid balance, assess for edema, trend weights  Outcome: Progressing  Goal: Absence of cardiac arrhythmias or at baseline  Description: INTERVENTIONS:  - Continuous cardiac monitoring, monitor vi Anticipate increased pain with activity and pre-medicate as appropriate  Outcome: Progressing     Problem: SAFETY ADULT - FALL  Goal: Free from fall injury  Description: INTERVENTIONS:  - Assess pt frequently for physical needs  - Identify cognitive and ph nutritional intake and initiate nutrition consult as needed  - Instruct patient on self management of diabetes  Outcome: Progressing

## 2020-12-20 NOTE — PLAN OF CARE
Patient lethargic and does not have much of an appetite. Cleared for discharge home; POA notified scripts for pushpa and neb tx coming home with patient. Patient currently resting comfortably in bed, locked in lowest position, call light within reach. and/or venous puncture sites for bleeding and/or hematoma  - Assess quality of pulses, skin color and temperature  - Assess for signs of decreased coronary artery perfusion - ex.  Angina  - Evaluate fluid balance, assess for edema, trend weights  Outcome: A Patient information on fall and injury prevention patient/family/discharge partner  - Complete POLST form as appropriate  - Assess patient's ability to be responsible for managing their own health  - Refer to Case Management Department for coordinating discharge planning if the patient needs post-hospital

## 2020-12-20 NOTE — PROGRESS NOTES
Patient's POA requesting patient be picked up at 1500 versus 1430. This RN called [facility] and changed time.

## 2020-12-20 NOTE — SLP NOTE
SPEECH DAILY NOTE - INPATIENT    ASSESSMENT & PLAN   ASSESSMENT  PPE REQUIRED. THIS THERAPIST WORE GLOVES, DROPLET MASK, AND GOGGLES FOR DURATION OF TX SESSION. HANDS WASHED UPON ENTRANCE/EXIT.     SLP f/u for ongoing dysphagia tx/meal assessment per recomm Administration Recommendations: Crushed in puree    Patient Experiencing Pain: No       Discharge Recommendations  Discharge Recommendations/Plan: Undetermined    Treatment Plan  Treatment Plan/Recommendations: Aspiration precautions; Dysphagia therapy    I

## 2020-12-20 NOTE — PROGRESS NOTES
[facility]    Cardiology Progress Note    Philippe Goldman Patient Status:  Inpatient    1925 MRN [id number]   Location [facility] 3W/SW Attending Tate Begum, [address] [facility] Day # 9 PCP Luigi Amezquita MD         Assessmen Value Date    WBC 25.3 (H) 12/20/2020    HGB 12.7 12/20/2020    HCT 37.2 12/20/2020    .0 12/20/2020    CREATSERUM 1.07 (H) 12/20/2020    BUN 41 (H) 12/20/2020     12/20/2020    K 4.6 12/20/2020     12/20/2020    CO2 22.0 12/20/2020    G

## 2020-12-20 NOTE — CM/SW NOTE
1549: SW notified by Jovan Bloom at [facility] patient does not have a qualifying dx for nebulizer informing Medicare does not recognize COVID-19 as a qualifying dx.  Jovan Bloom from [facility] contacting patient's [employer] to explain the above and offer self pay option which wi Ordered one unit PRBC Emelia BALDWIN made aware. will start blood transfusion when its ready from blood bank. will continue to monitor.

## 2020-12-21 NOTE — CM/SW NOTE
TIM received call from [facility] - request for orders/f2f to be sent. Information sent via aidin. TIM/CM to remain available for support and/or discharge planning.      [address], [place] [id number]

## 2020-12-22 NOTE — CM/SW NOTE
1109:  TIM received call from [facility], did not receive [place] [phone] orders/f2f that were sent on 12/21. TIM refaxed information via aidin. Pacheco Lloyd will attempt logging into aidin to retrieve applicable information.      1131 Addendum:   TIM received call from [place]

## 2021-01-01 ENCOUNTER — APPOINTMENT (OUTPATIENT)
Dept: GENERAL RADIOLOGY | Facility: HOSPITAL | Age: 86
End: 2021-01-01
Attending: EMERGENCY MEDICINE
Payer: MEDICARE

## 2021-01-01 ENCOUNTER — HOSPITAL ENCOUNTER (EMERGENCY)
Facility: HOSPITAL | Age: 86
End: 2021-01-01
Attending: EMERGENCY MEDICINE
Payer: MEDICARE

## 2021-01-01 VITALS — WEIGHT: 160 LBS | TEMPERATURE: 98 F

## 2021-01-01 DIAGNOSIS — I46.9 CARDIORESPIRATORY ARREST (HCC): Primary | ICD-10-CM

## 2021-01-01 LAB
ALBUMIN SERPL-MCNC: 2 G/DL (ref 3.4–5)
ALBUMIN/GLOB SERPL: 0.5 {RATIO} (ref 1–2)
ALP LIVER SERPL-CCNC: 125 U/L
ALT SERPL-CCNC: 597 U/L
ANION GAP SERPL CALC-SCNC: 18 MMOL/L (ref 0–18)
ANION GAP SERPL CALC-SCNC: 18 MMOL/L (ref 0–18)
AST SERPL-CCNC: 832 U/L (ref 15–37)
BACTERIA UR QL AUTO: NEGATIVE /HPF
BASE EXCESS BLD CALC-SCNC: -19.7 MMOL/L (ref ?–2)
BASOPHILS # BLD: 0 X10(3) UL (ref 0–0.2)
BASOPHILS NFR BLD: 0 %
BILIRUB SERPL-MCNC: 0.5 MG/DL (ref 0.1–2)
BILIRUB UR QL: NEGATIVE
BUN BLD-MCNC: 66 MG/DL (ref 7–18)
BUN BLD-MCNC: 69 MG/DL (ref 7–18)
BUN/CREAT SERPL: 22.6 (ref 10–20)
BUN/CREAT SERPL: 23.2 (ref 10–20)
CALCIUM BLD-MCNC: 8.5 MG/DL (ref 8.5–10.1)
CALCIUM BLD-MCNC: 9.1 MG/DL (ref 8.5–10.1)
CHLORIDE SERPL-SCNC: 120 MMOL/L (ref 98–112)
CHLORIDE SERPL-SCNC: 123 MMOL/L (ref 98–112)
CHOLEST SMN-MCNC: 69 MG/DL (ref ?–200)
CO2 SERPL-SCNC: 13 MMOL/L (ref 21–32)
CO2 SERPL-SCNC: 15 MMOL/L (ref 21–32)
COLOR UR: YELLOW
CREAT BLD-MCNC: 2.92 MG/DL
CREAT BLD-MCNC: 2.97 MG/DL
DEPRECATED RDW RBC AUTO: 91.3 FL (ref 35.1–46.3)
EOSINOPHIL # BLD: 0 X10(3) UL (ref 0–0.7)
EOSINOPHIL NFR BLD: 0 %
ERYTHROCYTE [DISTWIDTH] IN BLOOD BY AUTOMATED COUNT: 21.5 % (ref 11–15)
GLOBULIN PLAS-MCNC: 4.3 G/DL (ref 2.8–4.4)
GLUCOSE BLD-MCNC: 172 MG/DL (ref 70–99)
GLUCOSE BLD-MCNC: 213 MG/DL (ref 70–99)
GLUCOSE BLDC GLUCOMTR-MCNC: 134 MG/DL (ref 70–99)
GLUCOSE BLDC GLUCOMTR-MCNC: 150 MG/DL (ref 70–99)
GLUCOSE UR-MCNC: NEGATIVE MG/DL
HCO3 BLDA-SCNC: 8.9 MEQ/L (ref 21–27)
HCT VFR BLD AUTO: 45 %
HDLC SERPL-MCNC: 29 MG/DL (ref 40–59)
HGB BLD-MCNC: 13.2 G/DL
HYALINE CASTS #/AREA URNS AUTO: 13 /LPF
KETONES UR-MCNC: NEGATIVE MG/DL
LDLC SERPL CALC-MCNC: 8 MG/DL (ref ?–100)
LEUKOCYTE ESTERASE UR QL STRIP.AUTO: NEGATIVE
LYMPHOCYTES NFR BLD: 16 %
LYMPHOCYTES NFR BLD: 2.32 X10(3) UL (ref 1–4)
M PROTEIN MFR SERPL ELPH: 6.3 G/DL (ref 6.4–8.2)
MCH RBC QN AUTO: 33.6 PG (ref 26–34)
MCHC RBC AUTO-ENTMCNC: 29.3 G/DL (ref 31–37)
MCV RBC AUTO: 114.5 FL
METAMYELOCYTES # BLD: 0.15 X10(3) UL
METAMYELOCYTES NFR BLD: 1 %
MODIFIED ALLEN TEST: POSITIVE
MONOCYTES # BLD: 0.87 X10(3) UL (ref 0.1–1)
MONOCYTES NFR BLD: 6 %
NEUTROPHILS # BLD AUTO: 10.59 X10 (3) UL (ref 1.5–7.7)
NEUTROPHILS NFR BLD: 75 %
NEUTS BAND NFR BLD: 2 %
NEUTS HYPERSEG # BLD: 11.17 X10(3) UL (ref 1.5–7.7)
NITRITE UR QL STRIP.AUTO: NEGATIVE
NONHDLC SERPL-MCNC: 40 MG/DL (ref ?–130)
NRBC BLD MANUAL-RTO: 2 %
O2 CT BLD-SCNC: 13.3 VOL% (ref 15–23)
O2/TOTAL GAS SETTING VFR VENT: 100 %
OSMOLALITY SERPL CALC.SUM OF ELEC: 337 MOSM/KG (ref 275–295)
OSMOLALITY SERPL CALC.SUM OF ELEC: 346 MOSM/KG (ref 275–295)
PCO2 BLDA: 71 MM HG (ref 35–45)
PEEP SETTING VENT: 5 CM H2O
PH BLDA: 6.9 [PH] (ref 7.35–7.45)
PH UR: 5 [PH] (ref 5–8)
PLATELET # BLD AUTO: 245 10(3)UL (ref 150–450)
PLATELET MORPHOLOGY: NORMAL
PO2 BLDA: 60 MM HG (ref 80–100)
POTASSIUM SERPL-SCNC: 6 MMOL/L (ref 3.5–5.1)
POTASSIUM SERPL-SCNC: 6.3 MMOL/L (ref 3.5–5.1)
PROT UR-MCNC: 30 MG/DL
PUNCTURE CHARGE: YES
RBC # BLD AUTO: 3.93 X10(6)UL
RBC #/AREA URNS AUTO: 27 /HPF
RESP RATE: 30 BPM
SAO2 % BLDA: 75.5 % (ref 94–100)
SARS-COV-2 RNA RESP QL NAA+PROBE: NOT DETECTED
SODIUM SERPL-SCNC: 151 MMOL/L (ref 136–145)
SODIUM SERPL-SCNC: 156 MMOL/L (ref 136–145)
SP GR UR STRIP: 1.03 (ref 1–1.03)
TOTAL CELLS COUNTED: 100
TRIGL SERPL-MCNC: 160 MG/DL (ref 30–149)
TROPONIN I SERPL-MCNC: 1.41 NG/ML (ref ?–0.04)
UROBILINOGEN UR STRIP-ACNC: 2
VLDLC SERPL CALC-MCNC: 32 MG/DL (ref 0–30)
WBC # BLD AUTO: 14.5 X10(3) UL (ref 4–11)
WBC #/AREA URNS AUTO: 2 /HPF

## 2021-01-01 PROCEDURE — 36415 COLL VENOUS BLD VENIPUNCTURE: CPT | Performed by: EMERGENCY MEDICINE

## 2021-01-01 PROCEDURE — 94002 VENT MGMT INPAT INIT DAY: CPT

## 2021-01-01 PROCEDURE — 85027 COMPLETE CBC AUTOMATED: CPT | Performed by: EMERGENCY MEDICINE

## 2021-01-01 PROCEDURE — 85007 BL SMEAR W/DIFF WBC COUNT: CPT | Performed by: EMERGENCY MEDICINE

## 2021-01-01 PROCEDURE — 99292 CRITICAL CARE ADDL 30 MIN: CPT

## 2021-01-01 PROCEDURE — 92950 HEART/LUNG RESUSCITATION CPR: CPT

## 2021-01-01 PROCEDURE — 85025 COMPLETE CBC W/AUTO DIFF WBC: CPT | Performed by: EMERGENCY MEDICINE

## 2021-01-01 PROCEDURE — 96374 THER/PROPH/DIAG INJ IV PUSH: CPT

## 2021-01-01 PROCEDURE — 80053 COMPREHEN METABOLIC PANEL: CPT | Performed by: EMERGENCY MEDICINE

## 2021-01-01 PROCEDURE — 80048 BASIC METABOLIC PNL TOTAL CA: CPT | Performed by: EMERGENCY MEDICINE

## 2021-01-01 PROCEDURE — 93010 ELECTROCARDIOGRAM REPORT: CPT | Performed by: EMERGENCY MEDICINE

## 2021-01-01 PROCEDURE — 93005 ELECTROCARDIOGRAM TRACING: CPT

## 2021-01-01 PROCEDURE — 80061 LIPID PANEL: CPT | Performed by: EMERGENCY MEDICINE

## 2021-01-01 PROCEDURE — 96375 TX/PRO/DX INJ NEW DRUG ADDON: CPT

## 2021-01-01 PROCEDURE — 84484 ASSAY OF TROPONIN QUANT: CPT | Performed by: EMERGENCY MEDICINE

## 2021-01-01 PROCEDURE — 85060 BLOOD SMEAR INTERPRETATION: CPT | Performed by: EMERGENCY MEDICINE

## 2021-01-01 PROCEDURE — 82805 BLOOD GASES W/O2 SATURATION: CPT | Performed by: EMERGENCY MEDICINE

## 2021-01-01 PROCEDURE — 81001 URINALYSIS AUTO W/SCOPE: CPT | Performed by: EMERGENCY MEDICINE

## 2021-01-01 PROCEDURE — 31500 INSERT EMERGENCY AIRWAY: CPT

## 2021-01-01 PROCEDURE — 51702 INSERT TEMP BLADDER CATH: CPT

## 2021-01-01 PROCEDURE — 82962 GLUCOSE BLOOD TEST: CPT

## 2021-01-01 PROCEDURE — 36600 WITHDRAWAL OF ARTERIAL BLOOD: CPT | Performed by: EMERGENCY MEDICINE

## 2021-01-01 PROCEDURE — 99291 CRITICAL CARE FIRST HOUR: CPT

## 2021-01-01 PROCEDURE — 96361 HYDRATE IV INFUSION ADD-ON: CPT

## 2021-01-01 PROCEDURE — 94644 CONT INHLJ TX 1ST HOUR: CPT

## 2021-01-01 PROCEDURE — 71045 X-RAY EXAM CHEST 1 VIEW: CPT | Performed by: EMERGENCY MEDICINE

## 2021-01-01 RX ORDER — ALBUTEROL SULFATE 2.5 MG/3ML
2.5 SOLUTION RESPIRATORY (INHALATION) ONCE
Status: COMPLETED | OUTPATIENT
Start: 2021-01-01 | End: 2021-01-01

## 2021-01-01 RX ORDER — METHYLPREDNISOLONE SODIUM SUCCINATE 125 MG/2ML
INJECTION, POWDER, LYOPHILIZED, FOR SOLUTION INTRAMUSCULAR; INTRAVENOUS
Status: COMPLETED
Start: 2021-01-01 | End: 2021-01-01

## 2021-01-01 RX ORDER — DEXTROSE MONOHYDRATE 25 G/50ML
50 INJECTION, SOLUTION INTRAVENOUS ONCE
Status: COMPLETED | OUTPATIENT
Start: 2021-01-01 | End: 2021-01-01

## 2021-01-01 RX ORDER — SODIUM CHLORIDE 9 MG/ML
1000 INJECTION, SOLUTION INTRAVENOUS ONCE
Status: COMPLETED | OUTPATIENT
Start: 2021-01-01 | End: 2021-01-01

## 2021-01-01 RX ORDER — ALBUTEROL SULFATE 2.5 MG/3ML
SOLUTION RESPIRATORY (INHALATION)
Status: COMPLETED
Start: 2021-01-01 | End: 2021-01-01

## 2021-01-01 RX ORDER — METHYLPREDNISOLONE SODIUM SUCCINATE 125 MG/2ML
125 INJECTION, POWDER, LYOPHILIZED, FOR SOLUTION INTRAMUSCULAR; INTRAVENOUS ONCE
Status: COMPLETED | OUTPATIENT
Start: 2021-01-01 | End: 2021-01-01

## 2021-01-01 RX ORDER — CALCIUM GLUCONATE 94 MG/ML
1 INJECTION, SOLUTION INTRAVENOUS ONCE
Status: COMPLETED | OUTPATIENT
Start: 2021-01-01 | End: 2021-01-01

## 2021-01-06 PROBLEM — I46.9 CARDIORESPIRATORY ARREST (HCC): Status: ACTIVE | Noted: 2021-01-01

## 2021-01-07 NOTE — ED NOTES
Pt loss a pulse, pt asystole on monitor. No respiratory effort noted. Dr. Ramirez Thomas at bedside. Time of death 2252.

## 2021-01-07 NOTE — CODE DOCUMENTATION
Pt arrives via EMS with CPR in progress   Medics were called for sob since 2pm. Pt recently had COVID in december. Per medics pt was hypotensive, altered and bradycardic on scene. Pt then loss a pulse in the ambulance.      I gel in place, BVM assisted vent

## 2021-01-07 NOTE — ED INITIAL ASSESSMENT (HPI)
Pt arrives via EMS with CPR in progress   Medics were called for sob since 2pm. Pt recently had COVID in december. Per medics pt was hypotensive, altered and bradycardic on scene. Pt then loss a pulse in the ambulance.      I gel in place, 1 epi given by me

## 2021-01-07 NOTE — RESPIRATORY THERAPY NOTE
Received patient from paramedics and began ventilating the patient via BVM. Assisted MD with intubation and placed the patient on the ventilator post ROSC.  Patient's airway pressures exceeding safety limits on VC/AC and VC+ and tidal volumes were not able

## 2021-01-07 NOTE — ED PROVIDER NOTES
Patient Seen in: [facility] AND [facility] Emergency Department      History   Patient presents with:  Cardiac Arrest    Stated Complaint: Cardiac arrest    HPI/Subjective:   HPI    80year old female with h/o hypertension, gout, remote pulmonary embolism, recen Constitutional:       Appearance: She is well-developed. She is not diaphoretic. Interventions: She is intubated. Comments: Patient is not responsive, temporary airway in place, CPR via medics   HENT:      Head: Normocephalic and atraumatic. ABG PO2 60 (*)     ABG HCO3 8.9 (*)     Blood Gas Base Excess -19.7 (*)     ABG O2 Saturation 75.5 (*)     Oxygen Content 13.3 (*)     All other components within normal limits   LIPID PANEL - Abnormal; Notable for the following components:    HDL Bertha Report. Rate: 149  Rhythm: Atrial Fibrillation  Reading: A Fib with RVR                   MDM        Pulse Ox: (!) 83%, Abnormal, vent     Radiology findings: Xr Chest Ap Portable  (cpt=71045)    Result Date: 1/6/2021  CONCLUSION:  1.  Endotracheal and ent to let her pass naturally. The patient did not ever regain any neurologic function or purposeful movement.   The patient's ET tube was confirmed in place by visualization, auscultation and x-ray however she was very difficult to ventilate and oxygen satura

## 2021-01-07 NOTE — ED NOTES
Pt family remains at bedside. Pt family still requesting pt to be a full code. Pt remains hypotensive, received albuterol via ETT and a BVM. Pt has second liter of IV fluids  Infusing by a pressure bag.

## 2021-01-07 NOTE — PROGRESS NOTES
Pt actively coding.  requested  to accompany her when she spoke with the family - niece and granddaughter.  provided emotional support and non-anxious presence to family.        01/06/21 2044   Clinical Encounter Type   Visited With Famil

## 2021-01-07 NOTE — ED NOTES
Spoke with [facility] - RAQUEL MEDRANO from [employer]. Reference #[id number]. Per [employer] pt is deferred due to age.

## 2021-01-07 NOTE — ED NOTES
Per pt family they do not choose to have invasive cooling for the pt. Per  ok to place ice packs around pt. Ice packs placed in pt groin, arm pits, behind neck and chest. No change in pt status at this time.

## 2021-01-07 NOTE — ED NOTES
Transport at bedside. Pt placed in body bag and pt labels placed on pt for identification, All morgue papers given to transport. [facility]

## 2021-01-07 NOTE — ED NOTES
Pt has been difficult to ventilate since arriving to ED. Pt repositioned and suctioned. Pt continue to be ventilated by BVM. Best spO2 reading is 78% at this time.

## 2021-01-07 NOTE — ED NOTES
Spoke with Janeth Canela #[id number] from [facility] 's office. Per  the pt is not a  case. [facility]

## 2021-01-07 NOTE — ED NOTES
Pt next of kin is Tomer Blair (pt niece). Neelamroberto Robles can be reached at [phone]. Pt address was [address] [place] [address], [address] [place] [address]

## (undated) NOTE — IP AVS SNAPSHOT
[facility]            (For Outpatient Use Only) Initial Admit Date: 12/11/2020   Inpt/Obs Admit Date: Inpt: 12/11/20 / Obs: N/A   Discharge Date:    [place]:  [de-identified]   MRN: [de-identified]   CSN: [id number]   CEID: [id number]        E Subscriber Name:  Radha Eduardo :    Subscriber ID:  Pt Rel to Subscriber:    Hospital Account Financial Class: Medicare    2020